# Patient Record
Sex: MALE
[De-identification: names, ages, dates, MRNs, and addresses within clinical notes are randomized per-mention and may not be internally consistent; named-entity substitution may affect disease eponyms.]

---

## 2018-01-13 ENCOUNTER — HOSPITAL ENCOUNTER (EMERGENCY)
Dept: HOSPITAL 42 - ED | Age: 43
Discharge: HOME | End: 2018-01-13
Payer: COMMERCIAL

## 2018-01-13 VITALS
HEART RATE: 92 BPM | TEMPERATURE: 98.6 F | DIASTOLIC BLOOD PRESSURE: 91 MMHG | SYSTOLIC BLOOD PRESSURE: 144 MMHG | OXYGEN SATURATION: 95 % | RESPIRATION RATE: 18 BRPM
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
## 2018-01-13 DIAGNOSIS — S61.215A: Primary | ICD-10-CM

## 2018-01-13 NOTE — ED PDOC
Arrival/HPI





<BretAbel - Last Filed: 01/13/18 01:04>





- General


Historian: Patient





<venessaRomelia AUREA - Last Filed: 01/13/18 01:12>





- General


Chief Complaint: Abnormal Skin Integrity


Time Seen by Provider: 01/13/18 00:22





- History of Present Illness


Narrative History of Present Illness (Text): 





01/13/18 00:43


43yo male with no PMHx who present with complaint of laceration to left 4th 

finger. States he accidentally cut his finger, over a dresser mirror, while 

moving the dresser this night. He denies any other complaint. Up to date with 

his TD vaccination.











 (Romelia Estes A)





Past Medical History





- Provider Review


Nursing Documentation Reviewed: Yes





- Psychiatric


Hx Substance Use: No





<Romelia Estes AUREA - Last Filed: 01/13/18 01:12>





Family/Social History





- Physician Review


Nursing Documentation Reviewed: Yes


Family/Social History: Unknown Family HX


Smoking Status: Never Smoked


Hx Alcohol Use: No


Hx Substance Use: No





<Romelia Estes - Last Filed: 01/13/18 01:12>





Allergies/Home Meds





<Bret,Abel - Last Filed: 01/13/18 01:04>





<Romelia Estes AUREA - Last Filed: 01/13/18 01:12>


Allergies/Adverse Reactions: 


Allergies





No Known Allergies Allergy (Verified 01/13/18 00:06)


 








Home Medications: 


 Home Meds











 Medication  Instructions  Recorded  Confirmed


 


No Known Home Med  01/13/18 01/13/18














Review of Systems





- Physician Review


All systems were reviewed & negative as marked: Yes





- Review of Systems


Constitutional: Normal


Eyes: Normal


ENT: Normal


Respiratory: Normal


Cardiovascular: Normal


Gastrointestinal: Normal


Genitourinary Male: Normal


Musculoskeletal: Normal


Skin: Laceration (Left 4th finger)


Neurological: Normal


Endocrine: Normal


Hemo/Lymphatic: Normal


Psychiatric: Normal





<Romelia Estes - Last Filed: 01/13/18 01:12>





Physical Exam


Vital Signs Reviewed: Yes


Temperature: Afebrile


Blood Pressure: Normal


Pulse: Regular


Respiratory Rate: Normal


Appearance: Positive for: Well-Appearing, Non-Toxic, Comfortable


Pain Distress: None


Mental Status: Positive for: Alert and Oriented X 3





- Systems Exam


Head: Present: Atraumatic, Normocephalic


Pupils: Present: PERRL


Extroacular Muscles: Present: EOMI


Conjunctiva: Present: Normal


Mouth: Present: Moist Mucous Membranes


Neck: Present: Normal Range of Motion


Respiratory/Chest: Present: Clear to Auscultation, Good Air Exchange.  No: 

Respiratory Distress, Accessory Muscle Use


Cardiovascular: Present: Regular Rate and Rhythm, Normal S1, S2.  No: Murmurs


Abdomen: Present: Normal Bowel Sounds.  No: Tenderness, Distention, Peritoneal 

Signs


Back: Present: Normal Inspection


Upper Extremity: Present: Normal Inspection.  No: Cyanosis, Edema


Lower Extremity: Present: Normal Inspection.  No: Edema


Neurological: Present: GCS=15, CN II-XII Intact, Speech Normal


Skin: Present: Warm, Dry, Normal Color, Laceration (0.5cm linear superficial 

laceration to left index tuft).  No: Rashes


Psychiatric: Present: Alert, Oriented x 3, Normal Insight, Normal Concentration





<Romelia Estes - Last Filed: 01/13/18 01:12>


Vital Signs











  Temp Pulse Resp BP Pulse Ox


 


 01/13/18 00:06  98.6 F  92 H  18  144/91 H  95














Medical Decision Making





<Abel Queen - Last Filed: 01/13/18 01:04>





<Romelia Estes - Last Filed: 01/13/18 01:12>


ED Course and Treatment: 





01/13/18 01:11


Wound irrigated. Approximated with surgi seal and steri strip. Dressing 

applied. PT advised to keep wound clean and dry. Referred to her PMD. (Romelia Estes)





- PA / NP / Resident Statement


ANANDA has reviewed & agrees with the documentation as recorded.


ANANDA has examined the patient and agrees with the treatment plan.





<Abel Queen - Last Filed: 01/13/18 01:04>





Disposition/Present on Arrival





<Abel Queen - Last Filed: 01/13/18 01:04>





- Present on Arrival


Any Indicators Present on Arrival: No


History of DVT/PE: No


History of Uncontrolled Diabetes: No


Urinary Catheter: No


History of Decub. Ulcer: No


History Surgical Site Infection Following: None





- Disposition


Have Diagnosis and Disposition been Completed?: Yes


Disposition Time: 01:05


Patient Plan: Discharge





<Romelia Estes - Last Filed: 01/13/18 01:12>





- Disposition


Diagnosis: 


 Finger laceration





Disposition: HOME/ ROUTINE


Patient Problems: 


 Current Active Problems











Problem Status Onset


 


Finger laceration Acute  











Condition: STABLE


Discharge Instructions (ExitCare):  Finger Laceration (ED)


Additional Instructions: 


Keep wound clean and dry


Follow up with your doctor


Return to ED for fever, discharge from wound and redness


Referrals: 


Joycelyn Pena MD [Staff Provider] - Follow up with primary


Forms:  [employer] (English)